# Patient Record
Sex: FEMALE | Race: OTHER
[De-identification: names, ages, dates, MRNs, and addresses within clinical notes are randomized per-mention and may not be internally consistent; named-entity substitution may affect disease eponyms.]

---

## 2019-03-20 ENCOUNTER — HOSPITAL ENCOUNTER (EMERGENCY)
Dept: HOSPITAL 15 - ER | Age: 84
Discharge: HOME | End: 2019-03-20
Payer: COMMERCIAL

## 2019-03-20 VITALS — BODY MASS INDEX: 23.18 KG/M2 | HEIGHT: 59 IN | WEIGHT: 115 LBS

## 2019-03-20 VITALS — DIASTOLIC BLOOD PRESSURE: 66 MMHG | SYSTOLIC BLOOD PRESSURE: 172 MMHG
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
## 2019-03-20 DIAGNOSIS — S52.092A: Primary | ICD-10-CM
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
## 2019-03-20 DIAGNOSIS — I10: ICD-10-CM
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
## 2019-03-20 PROCEDURE — 73080 X-RAY EXAM OF ELBOW: CPT

## 2019-03-20 PROCEDURE — 29105 APPLICATION LONG ARM SPLINT: CPT

## 2020-01-22 ENCOUNTER — HOSPITAL ENCOUNTER (EMERGENCY)
Dept: HOSPITAL 15 - ER | Age: 85
Discharge: HOME | End: 2020-01-22
Payer: MEDICAID

## 2020-01-22 VITALS — BODY MASS INDEX: 26.5 KG/M2 | HEIGHT: 60 IN | WEIGHT: 135 LBS

## 2020-01-22 VITALS — DIASTOLIC BLOOD PRESSURE: 54 MMHG | SYSTOLIC BLOOD PRESSURE: 140 MMHG
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
## 2020-01-22 DIAGNOSIS — S62.102A: Primary | ICD-10-CM

## 2020-01-22 DIAGNOSIS — I10: ICD-10-CM

## 2020-01-22 LAB
ALBUMIN SERPL-MCNC: 3.4 G/DL (ref 3.4–5)
ALP SERPL-CCNC: 138 U/L (ref 45–117)
ALT SERPL-CCNC: 23 U/L (ref 13–56)
ANION GAP SERPL CALCULATED.3IONS-SCNC: 9 MMOL/L (ref 5–15)
BILIRUB SERPL-MCNC: 0.3 MG/DL (ref 0.2–1)
BUN SERPL-MCNC: 19 MG/DL (ref 7–18)
BUN/CREAT SERPL: 29.2
CALCIUM SERPL-MCNC: 9.1 MG/DL (ref 8.5–10.1)
CHLORIDE SERPL-SCNC: 104 MMOL/L (ref 98–107)
CO2 SERPL-SCNC: 23 MMOL/L (ref 21–32)
GLUCOSE SERPL-MCNC: 95 MG/DL (ref 74–106)
HCT VFR BLD AUTO: 44.6 % (ref 36–46)
HGB BLD-MCNC: 14.2 G/DL (ref 12.2–16.2)
MCH RBC QN AUTO: 29.6 PG (ref 28–32)
MCV RBC AUTO: 92.7 FL (ref 80–100)
NRBC BLD QL AUTO: 0.1 %
POTASSIUM SERPL-SCNC: 4 MMOL/L (ref 3.5–5.1)
PROT SERPL-MCNC: 8.2 G/DL (ref 6.4–8.2)
SODIUM SERPL-SCNC: 136 MMOL/L (ref 136–145)

## 2020-01-22 PROCEDURE — 36415 COLL VENOUS BLD VENIPUNCTURE: CPT

## 2020-01-22 PROCEDURE — 85025 COMPLETE CBC W/AUTO DIFF WBC: CPT

## 2020-01-22 PROCEDURE — 73110 X-RAY EXAM OF WRIST: CPT

## 2020-01-22 PROCEDURE — 73130 X-RAY EXAM OF HAND: CPT

## 2020-01-22 PROCEDURE — 29125 APPL SHORT ARM SPLINT STATIC: CPT

## 2020-01-22 PROCEDURE — 80053 COMPREHEN METABOLIC PANEL: CPT

## 2020-01-22 PROCEDURE — 73080 X-RAY EXAM OF ELBOW: CPT

## 2020-01-22 PROCEDURE — 84484 ASSAY OF TROPONIN QUANT: CPT

## 2020-07-31 ENCOUNTER — HOSPITAL ENCOUNTER (INPATIENT)
Dept: HOSPITAL 15 - ER | Age: 85
LOS: 2 days | Discharge: HOME | DRG: 199 | End: 2020-08-02
Attending: HOSPITALIST | Admitting: HOSPITALIST
Payer: MEDICAID

## 2020-07-31 VITALS — BODY MASS INDEX: 30.72 KG/M2 | HEIGHT: 61 IN | WEIGHT: 162.7 LBS

## 2020-07-31 DIAGNOSIS — Z79.899: ICD-10-CM

## 2020-07-31 DIAGNOSIS — I11.0: ICD-10-CM

## 2020-07-31 DIAGNOSIS — I16.0: Primary | ICD-10-CM

## 2020-07-31 DIAGNOSIS — E11.9: ICD-10-CM

## 2020-07-31 DIAGNOSIS — I50.33: ICD-10-CM

## 2020-07-31 LAB
ALBUMIN SERPL-MCNC: 3.3 G/DL (ref 3.4–5)
ALP SERPL-CCNC: 134 U/L (ref 45–117)
ALT SERPL-CCNC: 18 U/L (ref 13–56)
ANION GAP SERPL CALCULATED.3IONS-SCNC: 5 MMOL/L (ref 5–15)
BILIRUB SERPL-MCNC: 0.3 MG/DL (ref 0.2–1)
BUN SERPL-MCNC: 13 MG/DL (ref 7–18)
BUN/CREAT SERPL: 22.4
CALCIUM SERPL-MCNC: 8.9 MG/DL (ref 8.5–10.1)
CHLORIDE SERPL-SCNC: 105 MMOL/L (ref 98–107)
CO2 SERPL-SCNC: 28 MMOL/L (ref 21–32)
GLUCOSE SERPL-MCNC: 87 MG/DL (ref 74–106)
HCT VFR BLD AUTO: 46.2 % (ref 36–46)
HGB BLD-MCNC: 15 G/DL (ref 12.2–16.2)
MCH RBC QN AUTO: 29.6 PG (ref 28–32)
MCV RBC AUTO: 91.4 FL (ref 80–100)
NRBC BLD QL AUTO: 0.1 %
POTASSIUM SERPL-SCNC: 3.9 MMOL/L (ref 3.5–5.1)
PROT SERPL-MCNC: 7.9 G/DL (ref 6.4–8.2)
SODIUM SERPL-SCNC: 138 MMOL/L (ref 136–145)

## 2020-07-31 PROCEDURE — 71045 X-RAY EXAM CHEST 1 VIEW: CPT

## 2020-07-31 PROCEDURE — 80061 LIPID PANEL: CPT

## 2020-07-31 PROCEDURE — 80048 BASIC METABOLIC PNL TOTAL CA: CPT

## 2020-07-31 PROCEDURE — 97163 PT EVAL HIGH COMPLEX 45 MIN: CPT

## 2020-07-31 PROCEDURE — 85025 COMPLETE CBC W/AUTO DIFF WBC: CPT

## 2020-07-31 PROCEDURE — 93005 ELECTROCARDIOGRAM TRACING: CPT

## 2020-07-31 PROCEDURE — 73090 X-RAY EXAM OF FOREARM: CPT

## 2020-07-31 PROCEDURE — 93306 TTE W/DOPPLER COMPLETE: CPT

## 2020-07-31 PROCEDURE — 84484 ASSAY OF TROPONIN QUANT: CPT

## 2020-07-31 PROCEDURE — 83880 ASSAY OF NATRIURETIC PEPTIDE: CPT

## 2020-07-31 PROCEDURE — 36415 COLL VENOUS BLD VENIPUNCTURE: CPT

## 2020-07-31 PROCEDURE — 80053 COMPREHEN METABOLIC PANEL: CPT

## 2020-07-31 RX ADMIN — SODIUM CHLORIDE SCH MLS/HR: 0.9 INJECTION, SOLUTION INTRAVENOUS at 21:48

## 2020-08-01 VITALS — SYSTOLIC BLOOD PRESSURE: 144 MMHG | DIASTOLIC BLOOD PRESSURE: 65 MMHG

## 2020-08-01 VITALS — SYSTOLIC BLOOD PRESSURE: 151 MMHG | DIASTOLIC BLOOD PRESSURE: 60 MMHG

## 2020-08-01 VITALS — DIASTOLIC BLOOD PRESSURE: 73 MMHG | SYSTOLIC BLOOD PRESSURE: 147 MMHG

## 2020-08-01 LAB
ANION GAP SERPL CALCULATED.3IONS-SCNC: 3 MMOL/L (ref 5–15)
BUN SERPL-MCNC: 11 MG/DL (ref 7–18)
BUN/CREAT SERPL: 20
CALCIUM SERPL-MCNC: 8.4 MG/DL (ref 8.5–10.1)
CHLORIDE SERPL-SCNC: 107 MMOL/L (ref 98–107)
CHOLEST SERPL-MCNC: 148 MG/DL (ref ?–200)
CO2 SERPL-SCNC: 29 MMOL/L (ref 21–32)
GLUCOSE SERPL-MCNC: 101 MG/DL (ref 74–106)
HCT VFR BLD AUTO: 44.3 % (ref 36–46)
HDLC SERPL-MCNC: 40 MG/DL (ref 40–59)
HGB BLD-MCNC: 14.2 G/DL (ref 12.2–16.2)
MCH RBC QN AUTO: 29.5 PG (ref 28–32)
MCV RBC AUTO: 91.9 FL (ref 80–100)
NRBC BLD QL AUTO: 0 %
POTASSIUM SERPL-SCNC: 3.5 MMOL/L (ref 3.5–5.1)
SODIUM SERPL-SCNC: 139 MMOL/L (ref 136–145)
TRIGL SERPL-MCNC: 158 MG/DL (ref ?–150)

## 2020-08-01 RX ADMIN — SODIUM CHLORIDE SCH MLS/HR: 0.9 INJECTION, SOLUTION INTRAVENOUS at 13:34

## 2020-08-01 RX ADMIN — CALCIUM ACETATE SCH MG: 667 CAPSULE ORAL at 17:57

## 2020-08-01 NOTE — NUR
Telemetry admit from ER:

RE GERMAIN admitted to Telemetry unit after SBAR received. Patient oriented to LUDWIG LEDESMA, RN primary RN, unit, room, bed, and unit policies regarding patient care and visiting 
hours. Patient now on continuous telemetry monitoring, tele box # 67 and telemetry reading 
on arrival to unit is SR 63. Patient placed on bedside oxygen 2 LPM, weighed by bed scale 
and encouraged to call if they need something. All questions and concerns addressed, patient 
verbalized understanding. 

Vital signs stable-96.3, 65 HR, 17 RR, 100%, 147/73

## 2020-08-01 NOTE — NUR
CLOSING NOTE:

Patient resting in bed. No S/S of distress at this time.

Bed alarm activated for patient safety.

Care endorsed.

## 2020-08-01 NOTE — NUR
Opening Shift Note

Assumed care of patient, awake and alert, [place] speaking. No S/S of distress/SOB or pain.  
Instructed on POC and to call for assist PRN, will continue to monitor for changes Q1hr and 
PRN.Incontinent of urine keep clean and dry.

## 2020-08-02 VITALS — SYSTOLIC BLOOD PRESSURE: 166 MMHG | DIASTOLIC BLOOD PRESSURE: 79 MMHG

## 2020-08-02 VITALS — SYSTOLIC BLOOD PRESSURE: 180 MMHG | DIASTOLIC BLOOD PRESSURE: 69 MMHG

## 2020-08-02 VITALS — DIASTOLIC BLOOD PRESSURE: 59 MMHG | SYSTOLIC BLOOD PRESSURE: 139 MMHG

## 2020-08-02 RX ADMIN — CALCIUM ACETATE SCH MG: 667 CAPSULE ORAL at 08:58

## 2020-08-02 RX ADMIN — SODIUM CHLORIDE SCH MLS/HR: 0.9 INJECTION, SOLUTION INTRAVENOUS at 06:14

## 2020-08-02 NOTE — NUR
Discharge instructions given as ordered. Encourage to follow up with PMD as instructed. All 
questions and concerns addressed. Patient verbalized understanding.  Medication 
reconciliation form completed and copy given to patient. Home medications held in Pharmacy 
returned to patient, and needed vaccines given. IV removed with catheter intact, pressure 
dressing applied, brown catheter removed.  Telemetry unit returned to ICU. Patient taken to 
vehicle via wheelchair with all personal belongings, accompanied by staff and family member. 
No distress noted at time of departure. 

-------------------------------------------------------------------------------

Addendum: 08/02/20 at 1745 by LUDWIG LEDESMA RN RN

-------------------------------------------------------------------------------

nia Huerta home meds

## 2020-08-02 NOTE — NUR
MD returned call

Dr. Post returned call, updated on patient status and reason for call and relayed blood 
pressure of 184/82, pulse is 54, 180/69, pulse is 53, orders received of amlodepine 10mg.p.o 
now and daily, and hydralazine 10mg.i.v.p every 6 hour p.r.n if systolic blood pressure is 
above 160.  Continue care.

## 2020-08-02 NOTE — NUR
Opening Shift Note:

Assumed care of patient. Patient asleep at this time. No S/S of distress/SOB or pain. 

Bed in lowest locked position, side rails up x 2, call light within reach.

Patient will be instructed on POC and to call for assist PRN, will continue to monitor for 
changes Q1hr and PRN.